# Patient Record
Sex: FEMALE | Employment: FULL TIME | URBAN - METROPOLITAN AREA
[De-identification: names, ages, dates, MRNs, and addresses within clinical notes are randomized per-mention and may not be internally consistent; named-entity substitution may affect disease eponyms.]

---

## 2024-06-10 ENCOUNTER — TELEPHONE (OUTPATIENT)
Dept: ORTHOPEDIC SURGERY | Age: 49
End: 2024-06-10

## 2024-06-10 NOTE — TELEPHONE ENCOUNTER
Patient scheduled appointment online. Number in file is invalid. Unable to reach regarding appt for back. Ok to remain in this spot?

## 2024-06-24 ENCOUNTER — OFFICE VISIT (OUTPATIENT)
Dept: ORTHOPEDIC SURGERY | Age: 49
End: 2024-06-24
Payer: COMMERCIAL

## 2024-06-24 ENCOUNTER — TELEPHONE (OUTPATIENT)
Dept: ORTHOPEDIC SURGERY | Age: 49
End: 2024-06-24

## 2024-06-24 VITALS — HEIGHT: 70 IN | BODY MASS INDEX: 29.78 KG/M2 | WEIGHT: 208 LBS

## 2024-06-24 DIAGNOSIS — M54.41 ACUTE RIGHT-SIDED LOW BACK PAIN WITH RIGHT-SIDED SCIATICA: Primary | ICD-10-CM

## 2024-06-24 PROCEDURE — 99204 OFFICE O/P NEW MOD 45 MIN: CPT | Performed by: EMERGENCY MEDICINE

## 2024-06-24 RX ORDER — MONTELUKAST SODIUM 10 MG/1
10 TABLET ORAL NIGHTLY
COMMUNITY

## 2024-06-24 RX ORDER — METHYLPREDNISOLONE 4 MG/1
TABLET ORAL
Qty: 1 KIT | Refills: 0 | Status: SHIPPED | OUTPATIENT
Start: 2024-06-24 | End: 2024-06-30

## 2024-06-24 ASSESSMENT — ENCOUNTER SYMPTOMS: BACK PAIN: 1

## 2024-06-24 NOTE — TELEPHONE ENCOUNTER
General Question     Subject: LUMBAR MRI   Patient and /or Facility Request: David White   Contact Number: [phone]       PATIENT CALLING REGARDING MRI     PATIENT TATED SHE CALLED PRO SCAN TO JOSEY A APPT BUT SHE WAS TOLD THAT  NEEDS TO REQUEST A EXPEDITE FOR INS    PLEASE GIVE PATIENT A CALL [phone]

## 2024-06-24 NOTE — PROGRESS NOTES
for lumbar radiculopathy.  I would like to place her on a Medrol Dosepak.  Stop taking the NSAIDs.  Continue the Flexeril as prescribed at night.  Formal physical therapy.  I will see her back in 1 week for MRI review.    Her goal is to ride in the 100 mile endurance horseback riding event at the end of July.    Orders Placed This Encounter   Procedures    XR LUMBAR SPINE (2-3 VIEWS)     Standing Status:   Future     Number of Occurrences:   1     Standing Expiration Date:   7/19/2024     Order Specific Question:   Reason for exam:     Answer:   PAIN    MRI LUMBAR SPINE WO CONTRAST     Standing Status:   Future     Standing Expiration Date:   6/24/2025     [facility] Inst[facility]     Order Specific Question:   Reason for exam:     Answer:   PAIN     Order Specific Question:   What is the sedation requirement?     Answer:   None    Ambulatory referral to Physical Therapy     Referral Priority:   Routine     Referral Type:   Physical Medicine     Requested Specialty:   Physical Therapist     Number of Visits Requested:   1       Follow-up:   Return in about 1 week (around 7/1/2024) for MRI review.  Sooner with any problems, questions, concerns, or worsening symptoms.    Electronically signed by Beverley Hastings MD on 6/24/2024 at 11:08 AM.    Disclaimer:  This note was dictated with voice recognition software.  Though review and correction are routine, we apologize for any errors.

## 2024-06-26 ENCOUNTER — HOSPITAL ENCOUNTER (OUTPATIENT)
Dept: PHYSICAL THERAPY | Age: 49
Setting detail: THERAPIES SERIES
Discharge: HOME | End: 2024-06-26
Payer: COMMERCIAL

## 2024-06-26 DIAGNOSIS — M54.50 CHRONIC RIGHT-SIDED LOW BACK PAIN WITHOUT SCIATICA: Primary | ICD-10-CM

## 2024-06-26 DIAGNOSIS — M25.651 HIP STIFFNESS, RIGHT: ICD-10-CM

## 2024-06-26 DIAGNOSIS — G89.29 CHRONIC RIGHT-SIDED LOW BACK PAIN WITHOUT SCIATICA: Primary | ICD-10-CM

## 2024-06-26 PROCEDURE — 97161 PT EVAL LOW COMPLEX 20 MIN: CPT

## 2024-06-26 NOTE — PLAN OF CARE
[facility] - Outpatient Rehabilitation and Therapy [address] [place] [address] office: [phone] fax: [phone]     Physical Therapy Initial Evaluation Certification    Dear Beverley Hastings MD,    We had the pleasure of evaluating the following patient for physical therapy services at [facility].  A summary of our findings can be found in the initial assessment below.  This includes our plan of care.  If you have any questions or concerns regarding these findings, please do not hesitate to contact me at the office phone number listed above.  Thank you for the referral.     Physician Signature:_______________________________Date:__________________  By signing above (or electronic signature), therapist’s plan is approved by physician       Physical Therapy: TREATMENT/PROGRESS NOTE   Patient: David White (48 y.o. female)   Examination Date: 2024   :  1975 MRN: [id number]   Visit #: 1   Insurance Allowable Auth Needed   40 [x]Yes - CARELON    []No    Insurance: Payor: [facility] / Plan: [facility] - [place] PPO / Product Type: *No Product type* /   Insurance ID: [id number] - ([facility])  Secondary Insurance (if applicable):    Treatment Diagnosis:     ICD-10-CM    1. Chronic right-sided low back pain without sciatica  M54.50     G89.29       2. Hip stiffness, right  M25.651          Medical Diagnosis:  Acute right-sided low back pain with right-sided sciatica [M54.41]   Referring Physician: Beverley Hastings MD  PCP: None, None     Plan of care signed (Y/N):     Date of Patient follow up with Physician:      Progress Report/POC: EVAL today  POC update due: (10 visits /OR AUTH LIMITS, whichever is less)  2024                                             Precautions/ Contra-indications:           Latex allergy:  NO  Pacemaker:    NO  Contraindications for Manipulation: None  Date of Surgery: N/A  Other:    Red Flags:  None    C-SSRS Triggered by

## 2024-07-01 ENCOUNTER — SCHEDULED TELEPHONE ENCOUNTER (OUTPATIENT)
Dept: ORTHOPEDIC SURGERY | Age: 49
End: 2024-07-01
Payer: COMMERCIAL

## 2024-07-01 DIAGNOSIS — M47.816 FACET ARTHROPATHY, LUMBAR: ICD-10-CM

## 2024-07-01 DIAGNOSIS — M54.41 ACUTE RIGHT-SIDED LOW BACK PAIN WITH RIGHT-SIDED SCIATICA: Primary | ICD-10-CM

## 2024-07-01 PROCEDURE — 99422 OL DIG E/M SVC 11-20 MIN: CPT | Performed by: EMERGENCY MEDICINE

## 2024-07-01 NOTE — PROGRESS NOTES
David White is a 48 y.o. female evaluated via telephone on 7/1/2024 for Results (MRI lumbar spine)      Documentation:  I communicated with the patient and/or health care decision maker about test results, which were ordered at the previous visit on 6/24/2024.     Radiology:  MRI images of the lumbar spine dated 6/26/2024 were reviewed independently and discussed with the patient.  The films revealed:   1.  Trace retrolisthesis of L5-S1 secondary to severe facet arthropathy with Modic type I endplate changes.  Recommend extension and flexion radiographs to evaluate for dynamic instability.  2.  Shallow central disc herniations at L4-L5 and L5-S1 abut the descending L5 and S1 nerve roots, respectively.  3.  No spinal nerve impingement.    Details of this discussion including any medical advice provided: MRI findings were discussed with the patient.  Given her symptoms, I am concerned that her clinical presentation is most likely consistent with lumbar radiculopathy.  However, she may have a component of symptomatic facet arthropathy as well.  I would like to refer her to either Dr. Barrientos or Dr. Cochran to discuss injections including possible epidural steroid injection versus facet injections.  Referrals were placed.  Given that she is on quite a tight timeline, she will call both physicians to get in for her follow-up appointments as soon as possible.      ICD-10-CM    1. Acute right-sided low back pain with right-sided sciatica  M54.41 Israel Zaragoza MD, Orthopedic Surgery (Spine)[facility]     External Referral To Orthopedic Surgery      2. Facet arthropathy, lumbar  M47.816 Israel Zaragoza MD, Orthopedic Surgery (Spine)[facility]     External Referral To Orthopedic Surgery           Orders Placed This Encounter   Procedures    Israel Zaragoza MD, Orthopedic Surgery (Spine), [facility] [place]     Referral Priority:   Routine     Referral Type:   Eval and Treat

## 2024-07-08 ENCOUNTER — TELEPHONE (OUTPATIENT)
Dept: ORTHOPEDIC SURGERY | Age: 49
End: 2024-07-08

## 2024-07-08 ENCOUNTER — HOSPITAL ENCOUNTER (OUTPATIENT)
Dept: PHYSICAL THERAPY | Age: 49
Setting detail: THERAPIES SERIES
Discharge: HOME | End: 2024-07-08
Payer: COMMERCIAL

## 2024-07-08 PROCEDURE — 97140 MANUAL THERAPY 1/> REGIONS: CPT

## 2024-07-08 PROCEDURE — 97110 THERAPEUTIC EXERCISES: CPT

## 2024-07-08 NOTE — FLOWSHEET NOTE
[facility] - Outpatient Rehabilitation and Therapy [address] [place] [address] office: [phone] fax: [phone]    Physical Therapy: TREATMENT/PROGRESS NOTE   Patient: David White (48 y.o. female)   Examination Date: 2024   :  1975 MRN: [id number]   Visit #:  (eval + total approved)  Insurance Allowable Auth Needed   40 [x]Yes - CARELON    []No  5 visits -    Insurance: Payor: [employer] / Plan: [employer] - [place] PPO / Product Type: *No Product type* /   Insurance ID: [id number] - ([facility])  Secondary Insurance (if applicable):    Treatment Diagnosis:     ICD-10-CM    1. Chronic right-sided low back pain without sciatica  M54.50     G89.29       2. Hip stiffness, right  M25.651          Medical Diagnosis:  Acute right-sided low back pain with right-sided sciatica [M54.41]   Referring Physician: Beverley Hastings MD  PCP: None, None     Plan of care signed (Y/N):     Date of Patient follow up with Physician:      Progress Report/POC: NO  POC update due: (10 visits /OR AUTH LIMITS, whichever is less)  2024                                             Precautions/ Contra-indications:           Latex allergy:  NO  Pacemaker:    NO  Contraindications for Manipulation: None  Date of Surgery: N/A  Other:    Red Flags:  None    C-SSRS Triggered by Intake questionnaire:   Patient answered 'NO' to both behavioral questions on intake.  No further screening warranted    Preferred Language for Healthcare:   [x] English       [] other:    SUBJECTIVE EXAMINATION     Patient stated complaint: Pt states she is back from vacation and was able to tolerate a lot of activity including white water rafting, however noticed stiffness and ache in low back into R buttock when sitting for driving. States she has not had any muscle spasms since stopping steroid. States she has a ride this weekend.     Test used Initial score  2024   Pain Summary VAS 0/10 currently 1/10

## 2024-07-08 NOTE — TELEPHONE ENCOUNTER
General Question     Subject: JOSEY FOR EPI  Patient and /or Facility Request: David White   Contact Number: [phone]     PT CLLED TO REQ A JOSEY FOR EPI HAS REFERRAL FROM DR. MARCH     PLEASE CLL TO ADV

## 2024-07-09 ENCOUNTER — TELEPHONE (OUTPATIENT)
Dept: ORTHOPEDIC SURGERY | Age: 49
End: 2024-07-09

## 2024-07-10 ENCOUNTER — HOSPITAL ENCOUNTER (OUTPATIENT)
Dept: PHYSICAL THERAPY | Age: 49
Setting detail: THERAPIES SERIES
Discharge: HOME | End: 2024-07-10
Payer: COMMERCIAL

## 2024-07-10 PROCEDURE — 97140 MANUAL THERAPY 1/> REGIONS: CPT

## 2024-07-10 PROCEDURE — 97110 THERAPEUTIC EXERCISES: CPT

## 2024-07-10 NOTE — TELEPHONE ENCOUNTER
OTHER    PATIENT CALLED TO RETURN MISSED CALL-SPEAK WITH BEST TO SCHEDULE INJECTION.     PLEASE CALL CELL [phone]    PLEASE ADVISE

## 2024-07-10 NOTE — FLOWSHEET NOTE
pain to be dull and aching  Pain decreases with: Resting and Medication  Pain increases with:  horse back riding     Relevant Medical History: chronic low back pain    Living status: INDEPENDENT    Occupation/School:  Work/School Status: Full time  Job Duties/Demands: NA    Sport/ Recreation/ Leisure/ Hobbies: HORSE BACK RIDING    OBJECTIVE EXAMINATION     6/26/24  ROM/Strength: (Blank cells denote NT)      Mvmt (norm) AROM L AROM R Notes PROM L PROM R Notes     LUMBAR Flex (90)         Ext (25)         SB (25)          Rotation (30)             HIP Flex (120) 128 125        Abd (45)          ER (50) 44 41        IR (45) 28 27        Ext (20)         KNEE Flex (140)          Ext (0)           ANKLE DF (20)          PF (50)          Inversion (30)          Eversion (20)             MMT L          MMT  R Notes     LUMBAR  Flexion       Extension       Lateral flexion        Rotation          MMT L MMT R Notes       HIP  Flexion        Abduction 4-/5 4-/5      ER        IR        Extension      KNEE  Flexion        Extension        ANKLE  DF        PF        Inversion        Eversion        Repeated Movements: [] Normal  [] Abnormal [] N/A    Palpation:   Patient reported tenderness with palpation  Location: R lumbar paraspinals, R glute med    Specific Joint Mobility Testing/Accessory Motions:      Lumbar: hypomobility of Right sided L2 L3 L4 L5 S1  Hip:  hypomobile on R    Special Tests:  fwd bend- aberrant or innominate mvmt Negative  Kemps/quadrant Positive  SLR Negative 70+ degrees left, 60 degrees right tight hamstringstight hamstrings   SLR Negative  Piriformis test/FAIR (Sciatic irritation from piriformis) : Negative  Velasquez's test (ANGELES):  neg  Hamstring 90-90 straight leg raise test: 140 deg  Trendelenburg sign: + R    Exercises/Interventions     Therapeutic Ex (45676)  resistance Sets/time Reps Notes/Cues/Progressions   Prone quad stretch    HEP   Prone glute/multifidus iso    HEP   Cat cow   10 segmental

## 2024-07-15 ENCOUNTER — HOSPITAL ENCOUNTER (OUTPATIENT)
Dept: PHYSICAL THERAPY | Age: 49
Setting detail: THERAPIES SERIES
Discharge: HOME | End: 2024-07-15
Payer: COMMERCIAL

## 2024-07-15 PROCEDURE — 97112 NEUROMUSCULAR REEDUCATION: CPT

## 2024-07-15 PROCEDURE — 97110 THERAPEUTIC EXERCISES: CPT

## 2024-07-15 PROCEDURE — 97140 MANUAL THERAPY 1/> REGIONS: CPT

## 2024-07-15 NOTE — FLOWSHEET NOTE
deg  Trendelenburg sign: + R    Exercises/Interventions     Therapeutic Ex (75645)  resistance Sets/time Reps Notes/Cues/Progressions   Prone quad stretch    HEP   Prone glute/multifidus iso    HEP   SL lumbar gapping   10    Cat cow   10 segmental   Quadruped spinal rotation   10 Bilateral; limited to R   Quadruped hip ext    bilateral   Hooklying hip abd reactive iso Yellow AK      Hooklying hip abd rakesh Yellow AK      Leg press 110#      SLS glute iso    Bilateral; challenging c R hip in flexion   SL hip abduction on wall  2 15    Bridge c tb pull [place] TB  15    SLS c KB pass Green KB 2 10 CW/CCW   Clam Red tb  15 Bilateral; verbal cues for starting position   Reverse clam Red tb  15 Tactile cues   Side step c MB press Yellow AK  4# MB 2 30'    Paloff press Black TB 2 15 rakesh   Paloff press in split stance Black TB 2 15 rakesh          Manual Intervention (55541)  TIME     PA  6 min  Focus L4-S1   Hip mobs  4 min  Prone PA c rotation; supine lateral belt mobs, LAD                        NMR re-education (59215) resistance Sets/time Reps CUES NEEDED   TrA pelvic tilt in supine   10    TrA c ecc hip flexion   10 bilateral   TrA c BKFO   10 bilateral                 Therapeutic Activity (71632)  Sets/time                                          Modalities:    No modalities applied this session    Education/Home Exercise Program: Patient HEP program created electronically.  Refer to [employer] access code: [email or web address]        ASSESSMENT     Today's Assessment: Patient had excellent tolerance to today's session, reporting improved ROM, muscular fatigue, and increased soreness with program completed. Able to progress  intensity, resistance, and exercise difficulty on core, proximal hip strength, ROM with spinal mobility. Continues to display deficits in stiffness, weakness, and decreased dynamic stabilty which required ongoing skilled physical therapy and decision making. Continued progression of core and proximal hip strength

## 2024-07-22 ENCOUNTER — APPOINTMENT (OUTPATIENT)
Dept: PHYSICAL THERAPY | Age: 49
End: 2024-07-22
Payer: COMMERCIAL

## 2024-07-24 ENCOUNTER — APPOINTMENT (OUTPATIENT)
Dept: PHYSICAL THERAPY | Age: 49
End: 2024-07-24
Payer: COMMERCIAL

## 2025-01-07 ENCOUNTER — TELEPHONE (OUTPATIENT)
Dept: ORTHOPEDIC SURGERY | Age: 50
End: 2025-01-07

## 2025-01-07 DIAGNOSIS — M54.50 LOW BACK PAIN, UNSPECIFIED BACK PAIN LATERALITY, UNSPECIFIED CHRONICITY, UNSPECIFIED WHETHER SCIATICA PRESENT: Primary | ICD-10-CM

## 2025-01-07 DIAGNOSIS — M47.816 FACET ARTHROPATHY, LUMBAR: ICD-10-CM

## 2025-01-07 NOTE — TELEPHONE ENCOUNTER
General Question     Subject: REFERRAL   Patient and /or Facility Request: David White   Contact Number: [phone]     PATIENT REQUESTING A REFERRAL TO SEE A NEUROLOGIST     PLEASE ADVISE